# Patient Record
Sex: FEMALE | Race: WHITE | Employment: UNEMPLOYED | URBAN - METROPOLITAN AREA
[De-identification: names, ages, dates, MRNs, and addresses within clinical notes are randomized per-mention and may not be internally consistent; named-entity substitution may affect disease eponyms.]

---

## 2022-03-30 ENCOUNTER — HOSPITAL ENCOUNTER (EMERGENCY)
Age: 37
Discharge: HOME | End: 2022-03-30
Attending: STUDENT IN AN ORGANIZED HEALTH CARE EDUCATION/TRAINING PROGRAM
Payer: COMMERCIAL

## 2022-03-30 ENCOUNTER — APPOINTMENT (OUTPATIENT)
Dept: CT IMAGING | Age: 37
End: 2022-03-30
Payer: COMMERCIAL

## 2022-03-30 VITALS
BODY MASS INDEX: 35.55 KG/M2 | SYSTOLIC BLOOD PRESSURE: 137 MMHG | HEIGHT: 69 IN | HEART RATE: 66 BPM | RESPIRATION RATE: 12 BRPM | WEIGHT: 240 LBS | DIASTOLIC BLOOD PRESSURE: 73 MMHG | TEMPERATURE: 97.1 F | OXYGEN SATURATION: 98 %

## 2022-03-30 DIAGNOSIS — A59.9 TRICHIMONIASIS: ICD-10-CM

## 2022-03-30 DIAGNOSIS — N39.0 URINARY TRACT INFECTION IN FEMALE: Primary | ICD-10-CM

## 2022-03-30 DIAGNOSIS — R10.33 PERIUMBILICAL ABDOMINAL PAIN: ICD-10-CM

## 2022-03-30 DIAGNOSIS — K43.9 VENTRAL HERNIA WITHOUT OBSTRUCTION OR GANGRENE: ICD-10-CM

## 2022-03-30 LAB
A/G RATIO: 1.7 (ref 1.1–2.2)
ALBUMIN SERPL-MCNC: 4.7 G/DL (ref 3.4–5)
ALP BLD-CCNC: 88 U/L (ref 40–129)
ALT SERPL-CCNC: 67 U/L (ref 10–40)
ANION GAP SERPL CALCULATED.3IONS-SCNC: 10 MMOL/L (ref 3–16)
AST SERPL-CCNC: 46 U/L (ref 15–37)
BACTERIA: ABNORMAL /HPF
BASOPHILS ABSOLUTE: 0.1 K/UL (ref 0–0.2)
BASOPHILS RELATIVE PERCENT: 1.3 %
BILIRUB SERPL-MCNC: 0.6 MG/DL (ref 0–1)
BILIRUBIN URINE: NEGATIVE
BLOOD, URINE: NEGATIVE
BUN BLDV-MCNC: 8 MG/DL (ref 7–20)
CALCIUM SERPL-MCNC: 10.1 MG/DL (ref 8.3–10.6)
CHLORIDE BLD-SCNC: 98 MMOL/L (ref 99–110)
CLARITY: CLEAR
CO2: 25 MMOL/L (ref 21–32)
COLOR: ABNORMAL
CREAT SERPL-MCNC: 0.8 MG/DL (ref 0.6–1.1)
EOSINOPHILS ABSOLUTE: 0.6 K/UL (ref 0–0.6)
EOSINOPHILS RELATIVE PERCENT: 7.9 %
EPITHELIAL CELLS, UA: ABNORMAL /HPF (ref 0–5)
GFR AFRICAN AMERICAN: >60
GFR NON-AFRICAN AMERICAN: >60
GLUCOSE BLD-MCNC: 98 MG/DL (ref 70–99)
GLUCOSE URINE: NEGATIVE MG/DL
HCG QUALITATIVE: NEGATIVE
HCT VFR BLD CALC: 41.9 % (ref 36–48)
HEMOGLOBIN: 14.6 G/DL (ref 12–16)
KETONES, URINE: NEGATIVE MG/DL
LACTIC ACID, SEPSIS: 1 MMOL/L (ref 0.4–1.9)
LEUKOCYTE ESTERASE, URINE: ABNORMAL
LIPASE: 27 U/L (ref 13–60)
LYMPHOCYTES ABSOLUTE: 1.8 K/UL (ref 1–5.1)
LYMPHOCYTES RELATIVE PERCENT: 23 %
MCH RBC QN AUTO: 33.4 PG (ref 26–34)
MCHC RBC AUTO-ENTMCNC: 34.9 G/DL (ref 31–36)
MCV RBC AUTO: 95.6 FL (ref 80–100)
MICROSCOPIC EXAMINATION: YES
MONOCYTES ABSOLUTE: 0.3 K/UL (ref 0–1.3)
MONOCYTES RELATIVE PERCENT: 3.8 %
NEUTROPHILS ABSOLUTE: 4.9 K/UL (ref 1.7–7.7)
NEUTROPHILS RELATIVE PERCENT: 64 %
NITRITE, URINE: NEGATIVE
PDW BLD-RTO: 13.2 % (ref 12.4–15.4)
PH UA: 6.5 (ref 5–8)
PLATELET # BLD: 275 K/UL (ref 135–450)
PMV BLD AUTO: 8.1 FL (ref 5–10.5)
POTASSIUM REFLEX MAGNESIUM: 4 MMOL/L (ref 3.5–5.1)
PROTEIN UA: NEGATIVE MG/DL
RBC # BLD: 4.38 M/UL (ref 4–5.2)
RBC UA: ABNORMAL /HPF (ref 0–4)
SODIUM BLD-SCNC: 133 MMOL/L (ref 136–145)
SPECIFIC GRAVITY UA: 1.01 (ref 1–1.03)
TOTAL PROTEIN: 7.4 G/DL (ref 6.4–8.2)
TRICHOMONAS: PRESENT /HPF
URINE TYPE: ABNORMAL
UROBILINOGEN, URINE: 0.2 E.U./DL
WBC # BLD: 7.7 K/UL (ref 4–11)
WBC UA: ABNORMAL /HPF (ref 0–5)

## 2022-03-30 PROCEDURE — 74177 CT ABD & PELVIS W/CONTRAST: CPT

## 2022-03-30 PROCEDURE — 83605 ASSAY OF LACTIC ACID: CPT

## 2022-03-30 PROCEDURE — 6370000000 HC RX 637 (ALT 250 FOR IP): Performed by: STUDENT IN AN ORGANIZED HEALTH CARE EDUCATION/TRAINING PROGRAM

## 2022-03-30 PROCEDURE — 83690 ASSAY OF LIPASE: CPT

## 2022-03-30 PROCEDURE — 80053 COMPREHEN METABOLIC PANEL: CPT

## 2022-03-30 PROCEDURE — 99284 EMERGENCY DEPT VISIT MOD MDM: CPT

## 2022-03-30 PROCEDURE — 6360000004 HC RX CONTRAST MEDICATION: Performed by: STUDENT IN AN ORGANIZED HEALTH CARE EDUCATION/TRAINING PROGRAM

## 2022-03-30 PROCEDURE — 87086 URINE CULTURE/COLONY COUNT: CPT

## 2022-03-30 PROCEDURE — 81001 URINALYSIS AUTO W/SCOPE: CPT

## 2022-03-30 PROCEDURE — 84703 CHORIONIC GONADOTROPIN ASSAY: CPT

## 2022-03-30 PROCEDURE — 85025 COMPLETE CBC W/AUTO DIFF WBC: CPT

## 2022-03-30 PROCEDURE — 36415 COLL VENOUS BLD VENIPUNCTURE: CPT

## 2022-03-30 RX ORDER — CEPHALEXIN 500 MG/1
500 CAPSULE ORAL 2 TIMES DAILY
Qty: 14 CAPSULE | Refills: 0 | Status: SHIPPED | OUTPATIENT
Start: 2022-03-30 | End: 2022-04-06

## 2022-03-30 RX ORDER — METRONIDAZOLE 500 MG/1
500 TABLET ORAL 2 TIMES DAILY
Qty: 13 TABLET | Refills: 0 | Status: SHIPPED | OUTPATIENT
Start: 2022-03-30 | End: 2022-04-06

## 2022-03-30 RX ORDER — METRONIDAZOLE 250 MG/1
500 TABLET ORAL ONCE
Status: COMPLETED | OUTPATIENT
Start: 2022-03-30 | End: 2022-03-30

## 2022-03-30 RX ORDER — CEPHALEXIN 500 MG/1
500 CAPSULE ORAL ONCE
Status: COMPLETED | OUTPATIENT
Start: 2022-03-30 | End: 2022-03-30

## 2022-03-30 RX ADMIN — METRONIDAZOLE 500 MG: 250 TABLET ORAL at 16:51

## 2022-03-30 RX ADMIN — IOPAMIDOL 75 ML: 755 INJECTION, SOLUTION INTRAVENOUS at 16:26

## 2022-03-30 RX ADMIN — CEPHALEXIN 500 MG: 500 CAPSULE ORAL at 16:48

## 2022-03-30 ASSESSMENT — PAIN DESCRIPTION - LOCATION
LOCATION: ABDOMEN
LOCATION: ABDOMEN

## 2022-03-30 ASSESSMENT — PAIN DESCRIPTION - PAIN TYPE
TYPE: ACUTE PAIN
TYPE: ACUTE PAIN

## 2022-03-30 ASSESSMENT — PAIN SCALES - GENERAL
PAINLEVEL_OUTOF10: 7
PAINLEVEL_OUTOF10: 8
PAINLEVEL_OUTOF10: 7

## 2022-03-30 ASSESSMENT — PAIN - FUNCTIONAL ASSESSMENT: PAIN_FUNCTIONAL_ASSESSMENT: 0-10

## 2022-03-30 ASSESSMENT — PAIN DESCRIPTION - ORIENTATION
ORIENTATION: MID
ORIENTATION: MID

## 2022-03-30 ASSESSMENT — PAIN DESCRIPTION - DESCRIPTORS
DESCRIPTORS: CONSTANT
DESCRIPTORS: BURNING;ACHING;SHOOTING;SHARP

## 2022-03-30 NOTE — ED PROVIDER NOTES
The patient's care was signed out to my by the preceding provider. Please refer to their documentation for further information. CHIEF COMPLAINT  Chief Complaint   Patient presents with    Abdominal Pain     pt c/o abd pain, states believes it is a hernia but has not had it looked at yet       Briefly, Jacoby Crystal is a 31 y.o. female  who presents to the ED complaining of abdominal discomfort    FOCUSED PHYSICAL EXAMINATION  /64   Pulse 90   Temp 97.1 °F (36.2 °C) (Oral)   Resp 16   Ht 5' 8.5\" (1.74 m)   Wt 240 lb (108.9 kg)   SpO2 97%   BMI 35.96 kg/m²        MDM: Seen by previous physician. Found to have UTI likely from trichomoniasis. Started on antibiotics. Complaining of abdominal pain. CT scan does show large ventral hernia without obstruction or strangulation. Will be referred to general surgery and otherwise discharged home    During the patient's ED course, the patient was given:  Medications   iopamidol (ISOVUE-370) 76 % injection 75 mL (75 mLs IntraVENous Given 3/30/22 1626)   cephALEXin (KEFLEX) capsule 500 mg (500 mg Oral Given 3/30/22 1648)   metroNIDAZOLE (FLAGYL) tablet 500 mg (500 mg Oral Given 3/30/22 1651)        CLINICAL IMPRESSION  1. Urinary tract infection in female    2. Periumbilical abdominal pain    3. Trichimoniasis    4. Ventral hernia without obstruction or gangrene        DISPOSITION  Home      This chart was created using Dragon dictation software. Efforts were made by me to ensure accuracy, however some errors may be present due to limitations of this technology.         Maggie Stephens MD  03/30/22 [id number]

## 2022-03-30 NOTE — ED PROVIDER NOTES
[address] ED      CHIEF COMPLAINT  Abdominal Pain       HISTORY OF PRESENT ILLNESS  Delphina Olszewski is a 31 y.o. female with past medical history of chronic kidney disease who presents to the ED complaining of lower abdominal pain. Umbilical hernia for 6m  4d of worsening pain, intermittent feels hot. Aching pain, nonradiating.  8/10. No palliative factors. Worse with crunching upper stomach. She has taken ibuprofen. Nausea/Vomiting: denies  Diarrhea/Constipation: denies; Last BM: today  Vaginal Discharge/Bleeding/: denies, Last menses: 2/24, Sexual Activity: yes  Dysuria/urinary frequency: denies  Fever: denies  Previous abdominal surgeries: denies    Old records reviewed: No pertinent information noted. No other complaints, modifying factors or associated symptoms. I have reviewed the following from the nursing documentation. Past Medical History:   Diagnosis Date    Chronic kidney disease     enlarged kidneys    Mental disorder     anxiety    Postpartum depression      History reviewed. No pertinent surgical history.   Family History   Problem Relation Age of Onset    Diabetes Mother     Asthma Father     Diabetes Paternal Grandmother     Early Death Paternal Cousin      Social History     Socioeconomic History    Marital status: Single     Spouse name: Not on file    Number of children: Not on file    Years of education: Not on file    Highest education level: Not on file   Occupational History    Not on file   Tobacco Use    Smoking status: Current Some Day Smoker     Types: Cigarettes    Smokeless tobacco: Never Used   Substance and Sexual Activity    Alcohol use: Yes     Comment: occ    Drug use: No     Comment: takes two vicodin every 8 hours since she was [place] old    Sexual activity: Yes     Partners: Male   Other Topics Concern    Not on file   Social History Narrative    Not on file     Social Determinants of Health     Financial Resource Strain: Atraumatic. Mucous membranes are moist  NECK: Supple. Full range of motion without pain or stiffness  EYES: PERRL. EOM's grossly intact. HEART/CHEST: RRR. No murmurs. LUNGS: Respirations unlabored. CTAB. Good air exchange. Speaking comfortably in full sentences. ABDOMEN: Tender in the umbilicus, large hernia noted. Soft. No masses. No organomegaly. No guarding or rebound. MUSCULOSKELETAL: No extremity edema. Compartments soft. No deformity. No tenderness in the extremities. All extremities neurovascularly intact. SKIN: Warm and dry. No acute rashes. NEUROLOGICAL: Alert and oriented. No gross facial drooping. Strength 5/5, sensation intact. PSYCHIATRIC: Normal mood and affect. LABS  I have reviewed all labs for this visit.    Results for orders placed or performed during the hospital encounter of 03/30/22   CBC with Auto Differential   Result Value Ref Range    WBC 7.7 4.0 - 11.0 K/uL    RBC 4.38 4.00 - 5.20 M/uL    Hemoglobin 14.6 12.0 - 16.0 g/dL    Hematocrit 41.9 36.0 - 48.0 %    MCV 95.6 80.0 - 100.0 fL    MCH 33.4 26.0 - 34.0 pg    MCHC 34.9 31.0 - 36.0 g/dL    RDW 13.2 12.4 - 15.4 %    Platelets 636 881 - 780 K/uL    MPV 8.1 5.0 - 10.5 fL    Neutrophils % 64.0 %    Lymphocytes % 23.0 %    Monocytes % 3.8 %    Eosinophils % 7.9 %    Basophils % 1.3 %    Neutrophils Absolute 4.9 1.7 - 7.7 K/uL    Lymphocytes Absolute 1.8 1.0 - 5.1 K/uL    Monocytes Absolute 0.3 0.0 - 1.3 K/uL    Eosinophils Absolute 0.6 0.0 - 0.6 K/uL    Basophils Absolute 0.1 0.0 - 0.2 K/uL   Comprehensive Metabolic Panel w/ Reflex to MG   Result Value Ref Range    Sodium 133 (L) 136 - 145 mmol/L    Potassium reflex Magnesium 4.0 3.5 - 5.1 mmol/L    Chloride 98 (L) 99 - 110 mmol/L    CO2 25 21 - 32 mmol/L    Anion Gap 10 3 - 16    Glucose 98 70 - 99 mg/dL    BUN 8 7 - 20 mg/dL    CREATININE 0.8 0.6 - 1.1 mg/dL    GFR Non-African American >60 >60    GFR African American >60 >60    Calcium 10.1 8.3 - 10.6 mg/dL    Total Protein 7.4 6.4 - 8.2 g/dL    Albumin 4.7 3.4 - 5.0 g/dL    Albumin/Globulin Ratio 1.7 1.1 - 2.2    Total Bilirubin 0.6 0.0 - 1.0 mg/dL    Alkaline Phosphatase 88 40 - 129 U/L    ALT 67 (H) 10 - 40 U/L    AST 46 (H) 15 - 37 U/L   Lipase   Result Value Ref Range    Lipase 27.0 13.0 - 60.0 U/L   Urinalysis   Result Value Ref Range    Color, UA Straw Straw/Yellow    Clarity, UA Clear Clear    Glucose, Ur Negative Negative mg/dL    Bilirubin Urine Negative Negative    Ketones, Urine Negative Negative mg/dL    Specific Gravity, UA 1.010 1.005 - 1.030    Blood, Urine Negative Negative    pH, UA 6.5 5.0 - 8.0    Protein, UA Negative Negative mg/dL    Urobilinogen, Urine 0.2 <2.0 E.U./dL    Nitrite, Urine Negative Negative    Leukocyte Esterase, Urine LARGE (A) Negative    Microscopic Examination YES     Urine Type NotGiven    HCG Qualitative, Serum   Result Value Ref Range    hCG Qual Negative Detects HCG level >10 MIU/mL   Lactate, Sepsis   Result Value Ref Range    Lactic Acid, Sepsis 1.0 0.4 - 1.9 mmol/L   Microscopic Urinalysis   Result Value Ref Range    WBC, UA 21-50 (A) 0 - 5 /HPF    RBC, UA 5-10 (A) 0 - 4 /HPF    Epithelial Cells, UA 11-20 (A) 0 - 5 /HPF    Bacteria, UA 1+ (A) None Seen /HPF    Trichomonas, UA Present (A) None Seen /HPF       RADIOLOGY  CT ABDOMEN PELVIS W IV CONTRAST Additional Contrast? None              ED COURSE / MDM  Patient seen and evaluated. Old records reviewed and pertinent information included in HPI. Labs and imaging reviewed and results discussed with patient. Overall, well appearing patient in no distress presenting for abdominal pain. Physical exam remarkable for abdominal tenderness to palpation and hernia. Differential diagnosis includes but is not limited to: hernia, bowel obstruction,  diverticulitis, hernia, UTI, AAA, pregnancy, ectopic pregnancy, ovarian torsion, tubo-ovarian abscess.  Lower suspicion for gastroenteritis, peptic ulcer disease, cholecystitis, pancreatitis, hepatitis or other liver disease    Laboratory studies and imaging obtained. During the patient's ED course, the patient was given:  Medications   iopamidol (ISOVUE-370) 76 % injection 75 mL (75 mLs IntraVENous Given 3/30/22 1626)   cephALEXin (KEFLEX) capsule 500 mg (500 mg Oral Given 3/30/22 1648)   metroNIDAZOLE (FLAGYL) tablet 500 mg (500 mg Oral Given 3/30/22 1651)        ED Course as of 04/01/22 2230   Wed Mar 30, 2022   1556 Patient is not pregnant [ER]   1556 Lactate within normal limits [ER]   1556 Urinalysis screen shows evidence of possible infection, no evidence of blood. Patient denies dysuria. She does show trichomonas. Patient will be treated with Flagyl and Keflex. [ER]   1556 No leukocytosis, anemia, thrombocytopenia [ER]   1616 Lipase within normal limits, low suspicion for pancreatitis [ER]   1626 No significant electrolyte abnormalities or evidence of kidney dysfunction. [ER]   1626 Mild transaminitis, patient does not have right upper quadrant tenderness to palpation. [ER]      ED Course User Index  [ER] Alvarado Cardona MD        I have signed out [facility] Emergency Department care to Dr. Pamela Ramos. We discussed the pertinent history, physical exam, completed/pending test results (if applicable) and current treatment plan. Please refer to his/her chart for the patients remaining Emergency Department course and final disposition. CLINICAL IMPRESSION  1. Urinary tract infection in female    2. Periumbilical abdominal pain    3. Trichimoniasis    4. Ventral hernia without obstruction or gangrene        Blood pressure 137/73, pulse 66, temperature 97.1 °F (36.2 °C), temperature source Oral, resp. rate 12, height 5' 8.5\" (1.74 m), weight 240 lb (108.9 kg), SpO2 98 %. DISPOSITION  Pita Demarco was Signed out to oncoming physician in stable condition. Patient was given scripts for the following medications. I counseled patient how to take these medications.    Discharge Medication List as of 3/30/2022  6:33 PM      START taking these medications    Details   cephALEXin (KEFLEX) 500 MG capsule Take 1 capsule by mouth 2 times daily for 7 days, Disp-14 capsule, R-0Print      metroNIDAZOLE (FLAGYL) 500 MG tablet Take 1 tablet by mouth 2 times daily for 7 days, Disp-13 tablet, R-0Print             Follow-up with:  [facility] [place]  [phone]  Schedule an appointment as soon as possible for a visit       Nan Benoit MD  [address] [place] [address] Dr Stacey Joyce [address] [place] [address] [phone]    Schedule an appointment as soon as possible for a visit   Call for outpatient general surgery follow up      DISCLAIMER: This chart was created using Dragon dictation software. Efforts were made by me to ensure accuracy, however some errors may be present due to limitations of this technology and occasionally words are not transcribed correctly.         Hilary Villalobos MD  04/01/22 6676

## 2022-03-30 NOTE — ED TRIAGE NOTES
Chief Complaint   Patient presents with    Abdominal Pain     pt c/o abd pain, states believes it is a hernia but has not had it looked at yet

## 2022-03-31 LAB — URINE CULTURE, ROUTINE: NORMAL

## 2022-04-12 ENCOUNTER — INITIAL CONSULT (OUTPATIENT)
Dept: SURGERY | Age: 37
End: 2022-04-12
Payer: COMMERCIAL

## 2022-04-12 VITALS
HEIGHT: 69 IN | SYSTOLIC BLOOD PRESSURE: 124 MMHG | BODY MASS INDEX: 37.92 KG/M2 | DIASTOLIC BLOOD PRESSURE: 82 MMHG | WEIGHT: 256 LBS

## 2022-04-12 DIAGNOSIS — K43.9 VENTRAL HERNIA WITHOUT OBSTRUCTION OR GANGRENE: Primary | ICD-10-CM

## 2022-04-12 DIAGNOSIS — E66.9 OBESITY (BMI 30-39.9): ICD-10-CM

## 2022-04-12 PROCEDURE — G8427 DOCREV CUR MEDS BY ELIG CLIN: HCPCS | Performed by: SURGERY

## 2022-04-12 PROCEDURE — G8417 CALC BMI ABV UP PARAM F/U: HCPCS | Performed by: SURGERY

## 2022-04-12 PROCEDURE — 99243 OFF/OP CNSLTJ NEW/EST LOW 30: CPT | Performed by: SURGERY

## 2022-04-12 NOTE — PROGRESS NOTES
New Patient Via Troy Armando MD    [address], [address] [facility]  [facility]  [phone]    Maria Eugenia Banuelos   YOB: 1985    Date of Visit:  4/12/2022      Brenden Mena    Chief Complaint: Hernia    HPI: Patient presents for evaluation of an abdominal wall hernia. She states she has had it for years but recently it has gotten larger and more uncomfortable. She states she can get it fixed earlier because it did not really bother her and she did know what it was. Then she was in [facility] for a while and it got larger. She states is very prominent now. Is mildly uncomfortable. She denies nausea or vomiting. Her bowels are working normally    Allergies   Allergen Reactions    Codeine      No outpatient medications have been marked as taking for the 4/12/22 encounter (Initial consult) with Cathi Boudreaux MD.       Past Medical History:   Diagnosis Date    Chronic kidney disease     enlarged kidneys    Mental disorder     anxiety    Postpartum depression      History reviewed. No pertinent surgical history.   Family History   Problem Relation Age of Onset    Diabetes Mother     Asthma Father     Diabetes Paternal Grandmother     Early Death Paternal Cousin      Social History     Socioeconomic History    Marital status: Single     Spouse name: Not on file    Number of children: Not on file    Years of education: Not on file    Highest education level: Not on file   Occupational History    Not on file   Tobacco Use    Smoking status: Current Some Day Smoker     Types: Cigarettes    Smokeless tobacco: Never Used   Substance and Sexual Activity    Alcohol use: Yes     Comment: occ    Drug use: No     Comment: takes two vicodin every 8 hours since she was 15years old    Sexual activity: Yes     Partners: Male   Other Topics Concern    Not on file   Social History Narrative    Not on file     Social Determinants of Health     Financial Resource Strain:     Difficulty of Paying Living Expenses: Not on file   Food Insecurity:     Worried About Running Out of Food in the Last Year: Not on file    Joshua of Food in the Last Year: Not on file   Transportation Needs:     Lack of Transportation (Medical): Not on file    Lack of Transportation (Non-Medical): Not on file   Physical Activity:     Days of Exercise per Week: Not on file    Minutes of Exercise per Session: Not on file   Stress:     Feeling of Stress : Not on file   Social Connections:     Frequency of Communication with Friends and Family: Not on file    Frequency of Social Gatherings with Friends and Family: Not on file    Attends Worship Services: Not on file    Active Member of [address] [place] [address] [employer] or Organizations: Not on file    Attends Club or Organization Meetings: Not on file    Marital Status: Not on file   Intimate Partner Violence:     Fear of Current or Ex-Partner: Not on file    Emotionally Abused: Not on file    Physically Abused: Not on file    Sexually Abused: Not on file   Housing Stability:     Unable to Pay for Housing in the Last Year: Not on file    Number of [place] in the Last Year: Not on file    Unstable Housing in the Last Year: Not on file          Vitals:    04/12/22 1030   BP: 124/82   Site: Left Upper Arm   Position: Sitting   Cuff Size: Large Adult   Weight: 256 lb (116.1 kg)   Height: 5' 8.5\" (1.74 m)     Body mass index is 38.36 kg/m².      Wt Readings from Last 3 Encounters:   04/12/22 256 lb (116.1 kg)   03/30/22 240 lb (108.9 kg)     BP Readings from Last 3 Encounters:   04/12/22 124/82   03/30/22 137/73        REVIEW OF SYSTEMS:  CONSTITUTIONAL:  negative  HEENT:  Negative  RESPIRATORY:  negative  CARDIOVASCULAR:  negative  GASTROINTESTINAL:  positive for uncomfortable abdominal mass  GENITOURINARY:  negative  HEMATOLOGIC/LYMPHATIC:  negative  ENDOCRINE:  Negative  NEUROLOGICAL:  Negative  * All other ROS reviewed and